# Patient Record
Sex: MALE | HISPANIC OR LATINO | Employment: FULL TIME | URBAN - METROPOLITAN AREA
[De-identification: names, ages, dates, MRNs, and addresses within clinical notes are randomized per-mention and may not be internally consistent; named-entity substitution may affect disease eponyms.]

---

## 2023-01-05 ENCOUNTER — HOSPITAL ENCOUNTER (EMERGENCY)
Facility: CLINIC | Age: 33
Discharge: HOME | End: 2023-01-05
Attending: PHYSICIAN ASSISTANT | Admitting: PHYSICIAN ASSISTANT
Payer: OTHER MISCELLANEOUS

## 2023-01-05 VITALS
OXYGEN SATURATION: 100 % | RESPIRATION RATE: 16 BRPM | SYSTOLIC BLOOD PRESSURE: 119 MMHG | TEMPERATURE: 98 F | DIASTOLIC BLOOD PRESSURE: 78 MMHG | HEART RATE: 78 BPM

## 2023-01-05 DIAGNOSIS — S61.311A LACERATION OF LEFT INDEX FINGER WITHOUT FOREIGN BODY WITH DAMAGE TO NAIL, INITIAL ENCOUNTER: ICD-10-CM

## 2023-01-05 PROCEDURE — 99283 EMERGENCY DEPT VISIT LOW MDM: CPT

## 2023-01-05 PROCEDURE — 12001 RPR S/N/AX/GEN/TRNK 2.5CM/<: CPT

## 2023-01-05 RX ORDER — BUPIVACAINE HYDROCHLORIDE 5 MG/ML
10 INJECTION, SOLUTION EPIDURAL; INTRACAUDAL ONCE
Status: DISCONTINUED | OUTPATIENT
Start: 2023-01-05 | End: 2023-01-05 | Stop reason: HOSPADM

## 2023-01-05 ASSESSMENT — ACTIVITIES OF DAILY LIVING (ADL)
ADLS_ACUITY_SCORE: 33
ADLS_ACUITY_SCORE: 35

## 2023-01-05 ASSESSMENT — ENCOUNTER SYMPTOMS: WOUND: 1

## 2023-01-05 NOTE — ED TRIAGE NOTES
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 Pt reports he had a tetanus vaccine in [place] 2 years ago.

## 2023-01-06 NOTE — ED PROVIDER NOTES
History     Chief Complaint:  Laceration       The history is provided by the patient. A  was used ([place]ian).      Darryl Kuhn is a 32 year old male who presents with a finger laceration. 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 There is a laceration to the lateral left index finger. He notes pain with flexion and extension of finger. Bleeding controlled. His last tetanus vaccination occurred 2 years ago. Patient denies other injuries.      Independent Historian: History was provided by the patient. A  was used.      ROS:  Review of Systems   Skin: Positive for wound (laceration(right 2nd finger)).   All other systems reviewed and are negative.      Allergies:  Patient denies any active allergies.    Medications:    The patient is currently on no regular medications.    Past Medical History:    Patient denies a past medical history.     Social History:  Patient arrived via private vehicle.   He presents to the ED alone.     Physical Exam     Patient Vitals for the past 24 hrs:   BP Temp Temp src Pulse Resp SpO2   01/05/23 1724 119/78 98  F (36.7  C) Temporal 78 16 100 %        Physical Exam  Constitutional: Alert, attentive, GCS 15  HENT:    Nose: Nose normal.    Mouth/Throat: Oropharynx is clear, mucous membranes are moist   Eyes: EOM are normal.   CV: regular rate and rhythm; no murmurs, rubs or gallups  Chest: Effort normal and breath sounds normal.   GI:  There is no tenderness. No distension. Normal bowel sounds  MSK: Normal range of motion.   Neurological: Alert, attentive  Skin: Skin is warm and dry. Laceration to left index finger. Corner of nail is exposed. No nail avulsion. Bleeding controlled.    Emergency Department Course     Procedures     Laceration Repair      Procedure: Laceration Repair    Indication: Laceration    Consent: Verbal    Location: Right R second (index) finger    Length: 2.5  cm    Preparation: Irrigation with Sterile Saline.    Anesthesia/Sedation: Bupivacaine - 0.5%      Treatment/Exploration: Wound explored, no foreign bodies found     Closure: The wound was closed with one layer. Skin/superficial layer was closed with 8 x 5-0 Nylon using Interrupted sutures.     Patient Status: The patient tolerated the procedure well: Yes. There were no complications.    Emergency Department Course & Assessments:        Consultations/Discussion of Management or Tests:  1911 I performed a physical exam as noted above and obtained a history.   2017 I performed a laceration repair procedure. Patient tolerated the procedure well.  At this point I feel that the patient is safe for discharge, and the patient agrees.     Social Determinants of Health affecting care:  The patient's primary language is Lao.     Disposition:  The patient was discharged to home.     Impression & Plan    CMS Diagnoses: None    Medical Decision Making:  Darryl Kuhn is a 32 year old male who presents with a laceration to his right 2nd finger. The wound was carefully evaluated and explored. The laceration was closed with sutures as noted above. There is no evidence of muscular, tendon, or bony damage with this laceration. Patient maintains full ROM of finger including extension and flexion. No signs of foreign body. Corner of nail was exposed however entire nail remains anchored to bed and so no nail removal indicated. Possible complications (infection, scarring) were reviewed with the patient. Tetanus current. Follow up with primary care will be indicated for suture removal as noted in the discharge section.     Diagnosis:    ICD-10-CM    1. Laceration of left index finger without foreign body with damage to nail, initial encounter  S61.311A          Scribe Disclosure:  IAnni, am serving as a scribe at 6:21 PM on 1/5/2023 to document services personally performed by Radha Stubbs PA-C based on my  observations and the provider's statements to me.     1/5/2023   Radha Stubbs PA-C Steinbrueck, Emily, PA-C  01/05/23 9573

## (undated) RX ORDER — BUPIVACAINE HYDROCHLORIDE 5 MG/ML
INJECTION, SOLUTION EPIDURAL; INTRACAUDAL
Status: DISPENSED
Start: 2023-01-05